# Patient Record
Sex: FEMALE | Race: BLACK OR AFRICAN AMERICAN | NOT HISPANIC OR LATINO | Employment: FULL TIME | URBAN - METROPOLITAN AREA
[De-identification: names, ages, dates, MRNs, and addresses within clinical notes are randomized per-mention and may not be internally consistent; named-entity substitution may affect disease eponyms.]

---

## 2024-04-01 ENCOUNTER — HOSPITAL ENCOUNTER (EMERGENCY)
Facility: HOSPITAL | Age: 39
Discharge: HOME | End: 2024-04-01
Attending: EMERGENCY MEDICINE
Payer: MEDICAID

## 2024-04-01 VITALS
RESPIRATION RATE: 19 BRPM | TEMPERATURE: 98 F | DIASTOLIC BLOOD PRESSURE: 90 MMHG | HEART RATE: 81 BPM | WEIGHT: 293 LBS | BODY MASS INDEX: 62.78 KG/M2 | OXYGEN SATURATION: 89 % | SYSTOLIC BLOOD PRESSURE: 136 MMHG

## 2024-04-01 DIAGNOSIS — J45.901 MILD ASTHMA WITH EXACERBATION, UNSPECIFIED WHETHER PERSISTENT: Primary | ICD-10-CM

## 2024-04-01 LAB
B-HCG UR QL: NEGATIVE
CTP QC/QA: YES
INFLUENZA A, MOLECULAR: NEGATIVE
INFLUENZA B, MOLECULAR: NEGATIVE
OHS QRS DURATION: 80 MS
OHS QTC CALCULATION: 453 MS
SARS-COV-2 RDRP RESP QL NAA+PROBE: NEGATIVE
SPECIMEN SOURCE: NORMAL

## 2024-04-01 PROCEDURE — 99900035 HC TECH TIME PER 15 MIN (STAT): Mod: ER

## 2024-04-01 PROCEDURE — 93010 ELECTROCARDIOGRAM REPORT: CPT | Mod: ,,, | Performed by: INTERNAL MEDICINE

## 2024-04-01 PROCEDURE — U0002 COVID-19 LAB TEST NON-CDC: HCPCS | Mod: ER | Performed by: EMERGENCY MEDICINE

## 2024-04-01 PROCEDURE — 81025 URINE PREGNANCY TEST: CPT | Mod: ER | Performed by: NURSE PRACTITIONER

## 2024-04-01 PROCEDURE — 99284 EMERGENCY DEPT VISIT MOD MDM: CPT | Mod: 25,ER

## 2024-04-01 PROCEDURE — 87502 INFLUENZA DNA AMP PROBE: CPT | Mod: ER | Performed by: EMERGENCY MEDICINE

## 2024-04-01 PROCEDURE — 94640 AIRWAY INHALATION TREATMENT: CPT | Mod: ER

## 2024-04-01 PROCEDURE — 93005 ELECTROCARDIOGRAM TRACING: CPT | Mod: ER

## 2024-04-01 PROCEDURE — 25000242 PHARM REV CODE 250 ALT 637 W/ HCPCS: Mod: ER | Performed by: EMERGENCY MEDICINE

## 2024-04-01 RX ORDER — ALBUTEROL SULFATE 90 UG/1
6 AEROSOL, METERED RESPIRATORY (INHALATION)
Status: COMPLETED | OUTPATIENT
Start: 2024-04-01 | End: 2024-04-01

## 2024-04-01 RX ADMIN — ALBUTEROL SULFATE 6 PUFF: 90 AEROSOL, METERED RESPIRATORY (INHALATION) at 02:04

## 2024-04-01 NOTE — ED PROVIDER NOTES
Encounter Date: 2024       History     Chief Complaint   Patient presents with    Shortness of Breath     PT reports SOB that started about 45 minutes ago. PT reports hx of asthma.      38-year-old female past medical history including asthma presents with complaint of shortness of breath.  Patient says that about 1 hour prior to arrival, she was standing outside when the wind blew in her face and she had onset of chest tightness, shortness of breath and cough.  Says that she took the albuterol inhaler out of her purse about a month ago because she was never using it.  She denies any recent fever, chills.  Endorses a history of childhood hospitalizations for asthma but no recent admissions as an adult.    The history is provided by the patient.     Review of patient's allergies indicates:  No Known Allergies  Past Medical History:   Diagnosis Date    Anemia     Asthma     Depression     Iron deficiency anemia, unspecified     Obese      Past Surgical History:   Procedure Laterality Date     SECTION      x1     Family History   Problem Relation Age of Onset    Hypertension Unknown      Social History     Tobacco Use    Smoking status: Never    Smokeless tobacco: Never   Substance Use Topics    Alcohol use: Yes     Comment: occassionally    Drug use: No     Review of Systems    Physical Exam     Initial Vitals [24 1347]   BP Pulse Resp Temp SpO2   (!) 159/98 85 20 98.3 °F (36.8 °C) 98 %      MAP       --         Physical Exam    Nursing note and vitals reviewed.  Constitutional: She appears well-developed. She is not diaphoretic. No distress.   HENT:   Head: Normocephalic and atraumatic.   Eyes: EOM are normal. Pupils are equal, round, and reactive to light.   Neck:   Normal range of motion.  Cardiovascular:  Normal rate.           Pulmonary/Chest: Breath sounds normal. No respiratory distress. She has no wheezes.   Abdominal: She exhibits no distension.   Musculoskeletal:         General: No edema.  Normal range of motion.      Cervical back: Normal range of motion.     Neurological: She is alert and oriented to person, place, and time.   Skin: Skin is warm and dry.   Psychiatric: She has a normal mood and affect.         ED Course   Procedures  Labs Reviewed   INFLUENZA A & B BY MOLECULAR   SARS-COV-2 RNA AMPLIFICATION, QUAL    Narrative:     Is the patient symptomatic?->Yes   POCT URINE PREGNANCY     EKG Readings: (Independently Interpreted)   Initial Reading: No STEMI. Rhythm: Normal Sinus Rhythm. Heart Rate: 73. Ectopy: No Ectopy. Conduction: Normal. Axis: Normal. Clinical Impression: Normal Sinus Rhythm Other Impression: No prior ECG       Imaging Results              X-Ray Chest PA And Lateral (Final result)  Result time 04/01/24 14:48:30      Final result by Catalino Denson MD (04/01/24 14:48:30)                   Impression:      No acute process seen.      Electronically signed by: Catalino Denson MD  Date:    04/01/2024  Time:    14:48               Narrative:    EXAMINATION:  XR CHEST PA AND LATERAL    CLINICAL HISTORY:  chest tightness;    COMPARISON:  None    FINDINGS:  Cardiac silhouette is normal.  The lungs demonstrate no evidence of active disease.  No evidence of pleural effusion or pneumothorax.  Bones appear intact.                                       Medications   albuterol inhaler 6 puff (6 puffs Inhalation Given 4/1/24 1434)     Medical Decision Making  38-year-old female with past medical history including asthma presents with complaint of chest tightness and shortness of breath.  Upon arrival patient was afebrile, stable vital signs.  Differential includes but isn't limited to asthma exacerbation, viral syndrome, pneumonia, pneumothorax.  Chest x-ray ordered from triage without acute pathology including on my independent review.  X-ray without evidence of arrhythmia.  COVID and flu negative.  Patient's symptoms resolved after albuterol puffs.  Not consistent with an asthma  exacerbation requiring steroids, likely had a mild flare and did not have her albuterol inhaler.  Patient also does not want to take any steroids.  Says that she already has 3 albuterol inhaler refills and does not need any additional.  Discharged with strict return precautions and outpatient follow up.    No acute emergent medical condition has been identified. The patient appears to be low risk for an emergent medical condition is appropriate for discharge with outpatient f/u as detailed in discharge instructions for reevaluation and possible continued outpatient workup and management. I have discussed the workup with the patient, who has verbalized understanding of the plan and need for outpatient follow-up.  This evaluation does not preclude the development of an emergent condition so in addition, I have advised the patient that they can return to the ED at any time with worsening or change of their symptoms, or with any other medical complaint.       Amount and/or Complexity of Data Reviewed  External Data Reviewed: notes.     Details: Seen 3/15/24 by PCP for f/u of chronic medical conditions   Labs: ordered. Decision-making details documented in ED Course.  Radiology: ordered and independent interpretation performed.    Risk  OTC drugs.  Prescription drug management.               ED Course as of 04/01/24 1523   Mon Apr 01, 2024   1444 hCG Qualitative, Urine: Negative [AT]   1451 CXR Impression:   No acute process seen.   [AT]   1503 SARS-CoV-2 RNA, Amplification, Qual: Negative [AT]   1514 Influenza A, Molecular: Negative [AT]   1514 On reassessment, patient feels improved and ready for discharge [AT]      ED Course User Index  [AT] Vickie Arriola MD                           Clinical Impression:  Final diagnoses:  [J45.901] Mild asthma with exacerbation, unspecified whether persistent (Primary)          ED Disposition Condition    Discharge Stable          ED Prescriptions    None       Follow-up  Information       Follow up With Specialties Details Why Contact Info    Your primary care physician        [facility] - Emergency Dept Emergency Medicine  As needed, If symptoms worsen [address] [facility] [id number] [phone]             Vickie Arriola MD  04/01/24 1524

## 2024-04-01 NOTE — DISCHARGE INSTRUCTIONS

## 2024-04-01 NOTE — Clinical Note
"Loni Guthrie" Francisco Javier was seen and treated in our emergency department on 4/1/2024.  She may return to work on 04/02/2024.       If you have any questions or concerns, please don't hesitate to call.       RN    "

## 2024-04-01 NOTE — FIRST PROVIDER EVALUATION
Emergency Department TeleTriage Encounter Note      CHIEF COMPLAINT    Chief Complaint   Patient presents with    Shortness of Breath     PT reports SOB that started about 45 minutes ago. PT reports hx of asthma.        VITAL SIGNS   Initial Vitals [04/01/24 1347]   BP Pulse Resp Temp SpO2   (!) 159/98 85 20 98.3 °F (36.8 °C) 98 %      MAP       --            ALLERGIES    Review of patient's allergies indicates:  No Known Allergies    PROVIDER TRIAGE NOTE  Verbal consent for the teletriage evaluation was given by the patient at the start of the evaluation.  All efforts will be made to maintain patient's privacy during the evaluation.      This is a teletriage evaluation of a 38 y.o. female presenting to the ED with c/o SOB and chest tightness for 45 minutes.  Went to  and was sent here. Did not take any asthma medications PTA.  Limited physical exam via telehealth: The patient is awake, alert, answering questions appropriately and is not in respiratory distress.  As the Teletriage provider, I performed an initial assessment and ordered appropriate labs and imaging studies, if any, to facilitate the patient's care once placed in the ED. Once a room is available, care and a full evaluation will be completed by an alternate ED provider.  Any additional orders and the final disposition will be determined by that provider.  All imaging and labs will not be followed-up by the Teletriage Team, including myself.          ORDERS  Labs Reviewed   SARS-COV-2 RDRP GENE   POCT INFLUENZA A/B MOLECULAR   POCT URINE PREGNANCY       ED Orders (720h ago, onward)      Start Ordered     Status Ordering Provider    04/01/24 1356 04/01/24 1356  POCT COVID-19 Rapid Screening  Once         Ordered ELAINE RICHARD    04/01/24 1356 04/01/24 1356  POCT Influenza A/B Molecular  Once         Ordered ELAINE RICHARD    04/01/24 1356 04/01/24 1356  POCT urine pregnancy  Once         Ordered ELAINE RICHARD    04/01/24 1356 04/01/24 1356  EKG 12-lead   Once         Ordered ELAINE RICHARD    04/01/24 1356 04/01/24 1356  X-Ray Chest PA And Lateral  1 time imaging         Ordered ELAINE RICHARD              Virtual Visit Note: The provider triage portion of this emergency department evaluation and documentation was performed via [employer], a HIPAA-compliant telemedicine application, in concert with a tele-presenter in the room. A face to face patient evaluation with one of my colleagues will occur once the patient is placed in an emergency department room.      DISCLAIMER: This note was prepared with [employer] voice recognition transcription software. Garbled syntax, mangled pronouns, and other bizarre constructions may be attributed to that software system.